# Patient Record
Sex: MALE
[De-identification: names, ages, dates, MRNs, and addresses within clinical notes are randomized per-mention and may not be internally consistent; named-entity substitution may affect disease eponyms.]

---

## 2019-09-27 ENCOUNTER — HOSPITAL ENCOUNTER (OUTPATIENT)
Dept: HOSPITAL 53 - M LAB REF | Age: 84
End: 2019-09-27
Attending: FAMILY MEDICINE
Payer: MEDICARE

## 2019-09-27 DIAGNOSIS — E11.9: ICD-10-CM

## 2019-09-27 DIAGNOSIS — R53.83: ICD-10-CM

## 2019-09-27 DIAGNOSIS — Z12.11: Primary | ICD-10-CM

## 2019-09-27 DIAGNOSIS — Z79.01: ICD-10-CM

## 2019-09-27 DIAGNOSIS — Z12.5: ICD-10-CM

## 2019-09-27 LAB
25(OH)D3 SERPL-MCNC: 31.3 NG/ML (ref 30–100)
ALBUMIN SERPL BCG-MCNC: 3.9 GM/DL (ref 3.2–5.2)
ALT SERPL W P-5'-P-CCNC: 41 U/L (ref 12–78)
BILIRUB SERPL-MCNC: 1.2 MG/DL (ref 0.2–1)
BUN SERPL-MCNC: 15 MG/DL (ref 7–18)
CALCIUM SERPL-MCNC: 9.6 MG/DL (ref 8.8–10.2)
CHLORIDE SERPL-SCNC: 97 MEQ/L (ref 98–107)
CHOLEST SERPL-MCNC: 119 MG/DL (ref ?–200)
CHOLEST/HDLC SERPL: 2.12 {RATIO} (ref ?–5)
CO2 SERPL-SCNC: 30 MEQ/L (ref 21–32)
CREAT SERPL-MCNC: 1.09 MG/DL (ref 0.7–1.3)
EST. AVERAGE GLUCOSE BLD GHB EST-MCNC: 151 MG/DL (ref 60–110)
GFR SERPL CREATININE-BSD FRML MDRD: > 60 ML/MIN/{1.73_M2} (ref 35–?)
GLUCOSE SERPL-MCNC: 145 MG/DL (ref 70–100)
HDLC SERPL-MCNC: 56 MG/DL (ref 40–?)
LDLC SERPL CALC-MCNC: 36 MG/DL (ref ?–100)
NONHDLC SERPL-MCNC: 63 MG/DL
POTASSIUM SERPL-SCNC: 5.4 MEQ/L (ref 3.5–5.1)
PROT SERPL-MCNC: 7.5 GM/DL (ref 6.4–8.2)
SODIUM SERPL-SCNC: 133 MEQ/L (ref 136–145)
T4 FREE SERPL-MCNC: 0.82 NG/DL (ref 0.76–1.46)
TRIGL SERPL-MCNC: 134 MG/DL (ref ?–150)
TSH SERPL DL<=0.005 MIU/L-ACNC: 2.65 UIU/ML (ref 0.36–3.74)

## 2019-11-17 ENCOUNTER — HOSPITAL ENCOUNTER (EMERGENCY)
Dept: HOSPITAL 53 - M ED | Age: 84
Discharge: HOME | End: 2019-11-17
Payer: MEDICARE

## 2019-11-17 VITALS — HEIGHT: 68 IN | BODY MASS INDEX: 32.41 KG/M2 | WEIGHT: 213.85 LBS

## 2019-11-17 VITALS — SYSTOLIC BLOOD PRESSURE: 139 MMHG | DIASTOLIC BLOOD PRESSURE: 80 MMHG

## 2019-11-17 DIAGNOSIS — Z79.899: ICD-10-CM

## 2019-11-17 DIAGNOSIS — Z88.0: ICD-10-CM

## 2019-11-17 DIAGNOSIS — Z98.890: ICD-10-CM

## 2019-11-17 DIAGNOSIS — Z88.8: ICD-10-CM

## 2019-11-17 DIAGNOSIS — R94.31: ICD-10-CM

## 2019-11-17 DIAGNOSIS — E11.9: ICD-10-CM

## 2019-11-17 DIAGNOSIS — H81.10: Primary | ICD-10-CM

## 2019-11-17 DIAGNOSIS — Z79.84: ICD-10-CM

## 2019-11-17 DIAGNOSIS — I48.91: ICD-10-CM

## 2019-11-17 DIAGNOSIS — Z79.01: ICD-10-CM

## 2019-11-17 DIAGNOSIS — M25.512: ICD-10-CM

## 2019-11-17 LAB
ALBUMIN SERPL BCG-MCNC: 3.8 GM/DL (ref 3.2–5.2)
ALT SERPL W P-5'-P-CCNC: 54 U/L (ref 12–78)
APTT BLD: 48.7 SECONDS (ref 25–38.4)
BASOPHILS # BLD AUTO: 0.1 10^3/UL (ref 0–0.2)
BASOPHILS NFR BLD AUTO: 1.1 % (ref 0–1)
BILIRUB CONJ SERPL-MCNC: 0.2 MG/DL (ref 0–0.2)
BILIRUB SERPL-MCNC: 0.6 MG/DL (ref 0.2–1)
BUN SERPL-MCNC: 31 MG/DL (ref 7–18)
CALCIUM SERPL-MCNC: 9.7 MG/DL (ref 8.8–10.2)
CHLORIDE SERPL-SCNC: 102 MEQ/L (ref 98–107)
CK MB CFR.DF SERPL CALC: 1.91
CK MB SERPL-MCNC: 1.7 NG/ML (ref ?–3.6)
CK SERPL-CCNC: 89 U/L (ref 39–308)
CO2 SERPL-SCNC: 27 MEQ/L (ref 21–32)
CREAT SERPL-MCNC: 1.2 MG/DL (ref 0.7–1.3)
EOSINOPHIL # BLD AUTO: 0.2 10^3/UL (ref 0–0.5)
EOSINOPHIL NFR BLD AUTO: 2.3 % (ref 0–3)
GFR SERPL CREATININE-BSD FRML MDRD: > 60 ML/MIN/{1.73_M2} (ref 35–?)
GLUCOSE SERPL-MCNC: 197 MG/DL (ref 70–100)
HCT VFR BLD AUTO: 47.2 % (ref 42–52)
HGB BLD-MCNC: 16.1 G/DL (ref 13.5–17.5)
INR PPP: 2.59
LYMPHOCYTES # BLD AUTO: 2.7 10^3/UL (ref 1.5–5)
LYMPHOCYTES NFR BLD AUTO: 33.4 % (ref 24–44)
MCH RBC QN AUTO: 32.1 PG (ref 27–33)
MCHC RBC AUTO-ENTMCNC: 34.1 G/DL (ref 32–36.5)
MCV RBC AUTO: 94 FL (ref 80–96)
MONOCYTES # BLD AUTO: 1 10^3/UL (ref 0–0.8)
MONOCYTES NFR BLD AUTO: 12.2 % (ref 0–5)
NEUTROPHILS # BLD AUTO: 4 10^3/UL (ref 1.5–8.5)
NEUTROPHILS NFR BLD AUTO: 50.4 % (ref 36–66)
PLATELET # BLD AUTO: 185 10^3/UL (ref 150–450)
POTASSIUM SERPL-SCNC: 4 MEQ/L (ref 3.5–5.1)
PROT SERPL-MCNC: 7.8 GM/DL (ref 6.4–8.2)
PROTHROMBIN TIME: 27.6 SECONDS (ref 11.8–14)
RBC # BLD AUTO: 5.02 10^6/UL (ref 4.3–6.1)
SODIUM SERPL-SCNC: 135 MEQ/L (ref 136–145)
TROPONIN I SERPL-MCNC: < 0.02 NG/ML (ref ?–0.1)
TSH SERPL DL<=0.005 MIU/L-ACNC: 1.74 UIU/ML (ref 0.36–3.74)
WBC # BLD AUTO: 8 10^3/UL (ref 4–10)

## 2019-11-17 NOTE — REPVR
PROCEDURE INFORMATION: 

Exam: CT Head Without Contrast 

Exam date and time: 11/17/2019 8:04 PM 

Clinical history: 84 years old, male; Dizziness 



TECHNIQUE: 

Imaging protocol: Computed tomography of the head without contrast. 

Radiation optimization: All CT scans at this facility use at least one of these 

dose optimization techniques: automated exposure control; mA and/or kV 

adjustment per patient size (includes targeted exams where dose is matched to 

clinical indication); or iterative reconstruction. 



COMPARISON: 

No relevant prior studies available. 



FINDINGS: 

Brain: There is slight prominence of the peripheral sulci. 

Ventricles: Normal. No ventriculomegaly. 

Bones/joints: Left frontoparietal rogelio hole with small cover plate. 

Sinuses: Minimal ethmoid sinus mucosal thickening. 

Mastoid air cells: Visualized mastoid air cells are well aerated. 

Soft tissues: Unremarkable. 



IMPRESSION: 

1. Left frontoparietal rogelio hole. 

2. Minimal atrophy. 

3. Otherwise negative noncontrast head CT. 



Electronically signed by: Elliott Hadley On 11/17/2019  20:23:19 PM

## 2019-11-18 NOTE — ECGEPIP
[facility] - ED

                                       

                                       Test Date:    2019

Pat Name:     ELISEO SOUTH              Department:   

Patient ID:   [id number]                 Room:         -

Gender:       Male                     Technician:   

:          [id number]               Requested By: Emerson JEAN

Order Number: [id number]     Reading MD:   Stefanie Merlos

                                 Measurements

Intervals                              Axis          

Rate:         72                       P:            

DE:           0                        QRS:          -19

QRSD:         86                       T:            15

QT:           367                                    

QTc:          403                                    

                           Interpretive Statements

ATRIAL FIBRILLATION

ABNORMAL RHYTHM ECG

NO PRIOR

Electronically Signed on 2019 17:56:31 EST by Stefanie Merlos

## 2019-11-18 NOTE — REP
CHEST X-RAY:  Two views.

 

HISTORY:  Weakness.

 

FINDINGS:  Monitoring electrodes are seen.  The lungs are well inflated and

clear.  Pleural angles are sharp.  The heart is mildly enlarged.  Cardiothoracic

ratio measures 47.6%.  The heart appears somewhat prominent on the lateral

radiograph as well.  There are degenerative changes in the thoracic spine.  The

thoracic aorta is tortuous.  Pulmonary vasculature is not increased.

 

IMPRESSION:

 

Mildly prominent heart.  Otherwise, no acute disease.

 

 

Electronically Signed by

John Travis MD 11/18/2019 09:12 A

## 2019-11-18 NOTE — REP
Left shoulder:  Three views.

 

History:  Left shoulder pain with movement.

 

Findings:  Three views of the left shoulder demonstrate mild spurring at the AC

joint.  Glenohumeral and acromioclavicular joints are normally aligned.  No

fracture is seen.  Periarticular soft tissues are unremarkable.

 

Impression:

 

Mild AC joint osteoarthritis.  No acute bony abnormality.

 

 

Electronically Signed by

John Travis MD 11/18/2019 08:30 A

## 2020-02-07 ENCOUNTER — HOSPITAL ENCOUNTER (OUTPATIENT)
Dept: HOSPITAL 53 - M LAB REF | Age: 85
End: 2020-02-07
Attending: FAMILY MEDICINE
Payer: MEDICARE

## 2020-02-07 DIAGNOSIS — E11.9: Primary | ICD-10-CM

## 2020-02-07 LAB
ALBUMIN SERPL BCG-MCNC: 3.8 GM/DL (ref 3.2–5.2)
ALT SERPL W P-5'-P-CCNC: 32 U/L (ref 12–78)
BILIRUB SERPL-MCNC: 1.2 MG/DL (ref 0.2–1)
BUN SERPL-MCNC: 14 MG/DL (ref 7–18)
CALCIUM SERPL-MCNC: 9.3 MG/DL (ref 8.8–10.2)
CHLORIDE SERPL-SCNC: 97 MEQ/L (ref 98–107)
CHOLEST SERPL-MCNC: 143 MG/DL (ref ?–200)
CHOLEST/HDLC SERPL: 2.34 {RATIO} (ref ?–5)
CO2 SERPL-SCNC: 30 MEQ/L (ref 21–32)
CREAT SERPL-MCNC: 1.09 MG/DL (ref 0.7–1.3)
EST. AVERAGE GLUCOSE BLD GHB EST-MCNC: 160 MG/DL (ref 60–110)
GFR SERPL CREATININE-BSD FRML MDRD: > 60 ML/MIN/{1.73_M2} (ref 35–?)
GLUCOSE SERPL-MCNC: 227 MG/DL (ref 70–100)
HDLC SERPL-MCNC: 61 MG/DL (ref 40–?)
LDLC SERPL CALC-MCNC: 56 MG/DL (ref ?–100)
NONHDLC SERPL-MCNC: 82 MG/DL
POTASSIUM SERPL-SCNC: 4.3 MEQ/L (ref 3.5–5.1)
PROT SERPL-MCNC: 7.1 GM/DL (ref 6.4–8.2)
SODIUM SERPL-SCNC: 134 MEQ/L (ref 136–145)
TRIGL SERPL-MCNC: 132 MG/DL (ref ?–150)

## 2020-06-05 ENCOUNTER — HOSPITAL ENCOUNTER (OUTPATIENT)
Dept: HOSPITAL 53 - M LAB REF | Age: 85
End: 2020-06-05
Attending: FAMILY MEDICINE
Payer: MEDICARE

## 2020-06-05 DIAGNOSIS — E11.9: Primary | ICD-10-CM

## 2020-06-05 DIAGNOSIS — N40.1: ICD-10-CM

## 2020-06-05 DIAGNOSIS — Z79.01: ICD-10-CM

## 2020-06-05 LAB
EST. AVERAGE GLUCOSE BLD GHB EST-MCNC: 137 MG/DL (ref 60–110)
INR PPP: 2.06
PROTHROMBIN TIME: 23 SECONDS (ref 11.8–14)

## 2020-09-11 ENCOUNTER — HOSPITAL ENCOUNTER (OUTPATIENT)
Dept: HOSPITAL 53 - M LAB REF | Age: 85
End: 2020-09-11
Attending: FAMILY MEDICINE
Payer: MEDICARE

## 2020-09-11 DIAGNOSIS — Z86.79: Primary | ICD-10-CM

## 2020-09-11 DIAGNOSIS — Z79.01: ICD-10-CM

## 2020-09-11 LAB
INR PPP: 2.64
PROTHROMBIN TIME: 28.8 SECONDS (ref 11.8–14)